# Patient Record
Sex: FEMALE
[De-identification: names, ages, dates, MRNs, and addresses within clinical notes are randomized per-mention and may not be internally consistent; named-entity substitution may affect disease eponyms.]

---

## 2022-01-27 ENCOUNTER — NURSE TRIAGE (OUTPATIENT)
Dept: OTHER | Facility: CLINIC | Age: 32
End: 2022-01-27

## 2022-01-28 NOTE — TELEPHONE ENCOUNTER
Wilner Garcia called to check on Coricidin and Mucinex. Advised of Uptodate recommendations.      Reason for Disposition   [1] Caller has medicine question about med NOT prescribed by PCP AND [2] triager unable to answer question (e.g., compatibility with other med, storage)    Protocols used: MEDICATION QUESTION CALL-ADULT-

## 2022-11-21 ENCOUNTER — HOSPITAL ENCOUNTER (OUTPATIENT)
Age: 32
Discharge: HOME | End: 2022-11-21
Payer: MEDICARE

## 2022-11-21 ENCOUNTER — OFFICE VISIT (OUTPATIENT)
Dept: RHEUMATOLOGY | Age: 32
End: 2022-11-21
Payer: MEDICARE

## 2022-11-21 ENCOUNTER — NURSE ONLY (OUTPATIENT)
Dept: LAB | Age: 32
End: 2022-11-21

## 2022-11-21 ENCOUNTER — HOSPITAL ENCOUNTER (OUTPATIENT)
Dept: GENERAL RADIOLOGY | Age: 32
Discharge: HOME | End: 2022-11-21
Payer: MEDICARE

## 2022-11-21 VITALS
WEIGHT: 290 LBS | HEART RATE: 86 BPM | HEIGHT: 64 IN | BODY MASS INDEX: 49.51 KG/M2 | SYSTOLIC BLOOD PRESSURE: 122 MMHG | DIASTOLIC BLOOD PRESSURE: 78 MMHG | OXYGEN SATURATION: 98 %

## 2022-11-21 DIAGNOSIS — M19.90 INFLAMMATORY ARTHRITIS: ICD-10-CM

## 2022-11-21 DIAGNOSIS — L40.0 PLAQUE PSORIASIS: ICD-10-CM

## 2022-11-21 DIAGNOSIS — M19.90 INFLAMMATORY ARTHRITIS: Primary | ICD-10-CM

## 2022-11-21 LAB
BASOPHILS # BLD: 0.4 %
BASOPHILS ABSOLUTE: 0 THOU/MM3 (ref 0–0.1)
C-REACTIVE PROTEIN: 3.71 MG/DL (ref 0–1)
EOSINOPHIL # BLD: 1 %
EOSINOPHILS ABSOLUTE: 0.1 THOU/MM3 (ref 0–0.4)
ERYTHROCYTE [DISTWIDTH] IN BLOOD BY AUTOMATED COUNT: 14.4 % (ref 11.5–14.5)
ERYTHROCYTE [DISTWIDTH] IN BLOOD BY AUTOMATED COUNT: 49.6 FL (ref 35–45)
HCT VFR BLD CALC: 39.8 % (ref 37–47)
HEMOGLOBIN: 12.3 GM/DL (ref 12–16)
IMMATURE GRANS (ABS): 0.07 THOU/MM3 (ref 0–0.07)
IMMATURE GRANULOCYTES: 1 %
LYMPHOCYTES # BLD: 25.6 %
LYMPHOCYTES ABSOLUTE: 1.7 THOU/MM3 (ref 1–4.8)
MCH RBC QN AUTO: 29.2 PG (ref 26–33)
MCHC RBC AUTO-ENTMCNC: 30.9 GM/DL (ref 32.2–35.5)
MCV RBC AUTO: 94.5 FL (ref 81–99)
MONOCYTES # BLD: 5.7 %
MONOCYTES ABSOLUTE: 0.4 THOU/MM3 (ref 0.4–1.3)
NUCLEATED RED BLOOD CELLS: 0 /100 WBC
PLATELET # BLD: 281 THOU/MM3 (ref 130–400)
PMV BLD AUTO: 9.1 FL (ref 9.4–12.4)
RBC # BLD: 4.21 MILL/MM3 (ref 4.2–5.4)
RHEUMATOID FACTOR: < 10 IU/ML (ref 0–13)
SEDIMENTATION RATE, ERYTHROCYTE: 50 MM/HR (ref 0–20)
SEG NEUTROPHILS: 66.3 %
SEGMENTED NEUTROPHILS ABSOLUTE COUNT: 4.5 THOU/MM3 (ref 1.8–7.7)
WBC # BLD: 6.8 THOU/MM3 (ref 4.8–10.8)

## 2022-11-21 PROCEDURE — G8484 FLU IMMUNIZE NO ADMIN: HCPCS | Performed by: INTERNAL MEDICINE

## 2022-11-21 PROCEDURE — 99204 OFFICE O/P NEW MOD 45 MIN: CPT | Performed by: INTERNAL MEDICINE

## 2022-11-21 PROCEDURE — G8427 DOCREV CUR MEDS BY ELIG CLIN: HCPCS | Performed by: INTERNAL MEDICINE

## 2022-11-21 PROCEDURE — 4004F PT TOBACCO SCREEN RCVD TLK: CPT | Performed by: INTERNAL MEDICINE

## 2022-11-21 PROCEDURE — 73130 X-RAY EXAM OF HAND: CPT

## 2022-11-21 PROCEDURE — G8417 CALC BMI ABV UP PARAM F/U: HCPCS | Performed by: INTERNAL MEDICINE

## 2022-11-21 RX ORDER — FERROUS SULFATE 325(65) MG
325 TABLET ORAL
COMMUNITY

## 2022-11-21 RX ORDER — ETODOLAC 400 MG/1
400 TABLET, FILM COATED ORAL 2 TIMES DAILY
Qty: 60 TABLET | Refills: 2 | Status: SHIPPED | OUTPATIENT
Start: 2022-11-21 | End: 2023-11-21

## 2022-11-21 RX ORDER — LAMOTRIGINE 200 MG/1
1 TABLET ORAL DAILY
COMMUNITY
Start: 2022-11-15

## 2022-11-21 RX ORDER — LEVOTHYROXINE SODIUM 0.07 MG/1
1 TABLET ORAL DAILY
COMMUNITY
Start: 2022-10-07

## 2022-11-21 ASSESSMENT — ENCOUNTER SYMPTOMS
NAUSEA: 0
BLOOD IN STOOL: 0
WHEEZING: 0
COUGH: 0
VOMITING: 0
ABDOMINAL PAIN: 0
SHORTNESS OF BREATH: 0

## 2022-11-21 NOTE — PROGRESS NOTES
[address] [facility]   Rheumatology Clinic Note      11/21/2022       Reason for Consult:  pain and swelling in joints  Requesting Physician:  Elizabeth Franks MD     CHIEF COMPLAINT:    Chief Complaint   Patient presents with    New Patient     New pt other synovitis and tenosynovitis Lt hand other specified tissues disorder     BACK BILATERAL FEET, FINGERS RT HAND SWELLING AT TIMES VERY PAINFUL            HISTORY OF PRESENT ILLNESS:    27 y.o. female presents today for evaluation of pain and swelling in joints. A month and a half ago, started having pain and swelling in ankles and hands. Ibuprofen eases her symptoms. Pain is in the left 4th digit base. Having difficulty making a fist.    Pain improved after receiving steroid injection by orthopedics. But swelling persist. And cannot make a fist with her left hand. Has morning stiffness. Pain and stiffness is most pronounced upon awakening in the morning. This improves partially as day goes by with movement. Ibuprofen provides temporary relief. Has small area of psoriasis on both knees. Past Medical History:     has a past medical history of Anemia, Hypothyroid, and Seizures ([address] Past Surgical History:     has no past surgical history on file. Current Medications:      Current Outpatient Medications:     lamoTRIgine (LAMICTAL) 200 MG tablet, Take 1 tablet by mouth daily, Disp: , Rfl:     levothyroxine (SYNTHROID) 75 MCG tablet, Take 1 tablet by mouth daily, Disp: , Rfl:     ferrous sulfate (IRON 325) 325 (65 Fe) MG tablet, Take 325 mg by mouth daily (with breakfast), Disp: , Rfl:     etodolac (LODINE) 400 MG tablet, Take 1 tablet by mouth 2 times daily Take with food, Disp: 60 tablet, Rfl: 2    Allergies:    Patient has no known allergies. Social History:     reports that she has never smoked. She has never used smokeless tobacco. She reports that she does not currently use alcohol.  She reports that she does not currently use drugs. Family History:   family history includes Diabetes in her paternal grandmother. REVIEW OF SYSTEMS:    Review of Systems   Constitutional:  Negative for chills, fever and unexpected weight change. HENT:  Negative for mouth sores. Respiratory:  Negative for cough, shortness of breath and wheezing. Cardiovascular:  Negative for chest pain. Gastrointestinal:  Negative for abdominal pain, blood in stool, nausea and vomiting. Skin:  Positive for rash. Neurological:  Negative for headaches. PHYSICAL EXAM:    Vitals:    /78 (Site: Left Lower Arm, Position: Sitting, Cuff Size: Medium Adult)   Pulse 86   Ht 5' 4\" (1.626 m)   Wt 290 lb (131.5 kg)   SpO2 98%   BMI 49.78 kg/m²     Physical Exam  Constitutional:       General: She is not in acute distress. Appearance: Normal appearance. HENT:      Mouth/Throat:      Mouth: Mucous membranes are moist.      Pharynx: Oropharynx is clear. Eyes:      Extraocular Movements: Extraocular movements intact. Conjunctiva/sclera: Conjunctivae normal.   Cardiovascular:      Rate and Rhythm: Normal rate and regular rhythm. Heart sounds: Normal heart sounds. No murmur heard. No friction rub. Pulmonary:      Effort: Pulmonary effort is normal. No respiratory distress. Breath sounds: Normal breath sounds. No wheezing or rhonchi. Musculoskeletal:         General: Swelling and tenderness present. No deformity. Normal range of motion. Cervical back: Normal range of motion and neck supple. Right lower leg: No edema. Left lower leg: No edema. Comments: Unable to make a fist with her right hand. Swelling with tenderness in right 4th MCP. Swelling without tenderness in right 4th PIP joint. Swelling in ankles without tenderness. Lymphadenopathy:      Cervical: No cervical adenopathy. Skin:     General: Skin is warm and dry. Findings: Rash (plaque psoriasis on both knees) present. No lesion. Neurological:      General: No focal deficit present. Mental Status: She is alert and oriented to person, place, and time. Cranial Nerves: No cranial nerve deficit. Gait: Gait normal.   Psychiatric:         Mood and Affect: Mood normal.          DATA:                                  IMPRESSION/RECOMMENDATIONS:      1. Suspect psoriatic arthritis contributing to pt's presentation. She has oligoarticular arthritis with new symptoms for 1.5 months. -- trial of etodolac 400 mg bid. Discussed with pt the benefits, risks and adverse effects of this medication.  Patient expressed understanding.  -- labs ordered  -- xray of left hand    RTC in 2 weeks        Orders Placed This Encounter   Procedures    XR HAND LEFT (MIN 3 VIEWS)     Standing Status:   Future     Number of Occurrences:   1     Standing Expiration Date:   11/21/2023    Sedimentation Rate     Standing Status:   Future     Number of Occurrences:   1     Standing Expiration Date:   11/21/2023    C-Reactive Protein     Standing Status:   Future     Number of Occurrences:   1     Standing Expiration Date:   11/21/2023    CBC with Auto Differential     Standing Status:   Future     Number of Occurrences:   1     Standing Expiration Date:   5/21/2023    Rheumatoid Factor     Standing Status:   Future     Number of Occurrences:   1     Standing Expiration Date:   3/05/9036    Cyclic Citrul Peptide Antibody, IgG     Standing Status:   Future     Number of Occurrences:   1     Standing Expiration Date:   11/21/2023          Chuck Robles MD    [address]   [address] [facility]. [address] [facility]  [address] [place] [address]

## 2022-11-24 LAB — CYCLIC CITRULLINATED PEPTIDE ANTIBODY IGG: 1.2 U/ML (ref 0–7)

## 2022-12-05 ENCOUNTER — OFFICE VISIT (OUTPATIENT)
Dept: RHEUMATOLOGY | Age: 32
End: 2022-12-05
Payer: MEDICARE

## 2022-12-05 VITALS
BODY MASS INDEX: 49.51 KG/M2 | WEIGHT: 290 LBS | HEIGHT: 64 IN | DIASTOLIC BLOOD PRESSURE: 72 MMHG | OXYGEN SATURATION: 99 % | HEART RATE: 59 BPM | SYSTOLIC BLOOD PRESSURE: 140 MMHG

## 2022-12-05 DIAGNOSIS — M19.90 INFLAMMATORY ARTHRITIS: Primary | ICD-10-CM

## 2022-12-05 PROCEDURE — 99214 OFFICE O/P EST MOD 30 MIN: CPT | Performed by: INTERNAL MEDICINE

## 2022-12-05 PROCEDURE — G8417 CALC BMI ABV UP PARAM F/U: HCPCS | Performed by: INTERNAL MEDICINE

## 2022-12-05 PROCEDURE — G8427 DOCREV CUR MEDS BY ELIG CLIN: HCPCS | Performed by: INTERNAL MEDICINE

## 2022-12-05 PROCEDURE — 1036F TOBACCO NON-USER: CPT | Performed by: INTERNAL MEDICINE

## 2022-12-05 PROCEDURE — G8484 FLU IMMUNIZE NO ADMIN: HCPCS | Performed by: INTERNAL MEDICINE

## 2022-12-05 ASSESSMENT — ENCOUNTER SYMPTOMS
COUGH: 0
VOMITING: 0
SHORTNESS OF BREATH: 0
NAUSEA: 0
ABDOMINAL PAIN: 0

## 2022-12-05 NOTE — PROGRESS NOTES
[address] [facility]   Rheumatology Clinic Note      12/5/2022         CHIEF COMPLAINT:    Chief Complaint   Patient presents with    Follow-up     2 week f/u Inflammatory arthritis           HISTORY OF PRESENT ILLNESS:    27 y.o. female with suspected psoriatic arthritis presents for follow up. When seen last, labs were ordered and she was started on etodolac 400 mg bid. Reports significant improvement in her joint pain and swelling since starting etodolac. Reports, however, that the effects of medication lasts for around 8-10 hours. Notices pain and stiffness prior to her next dose. Overall, improvement. No significant joint swelling  Pain is most pronounced in lower back and feet. Has small area of psoriasis on both knees. Past Medical History:     has a past medical history of Anemia, Hypothyroid, and Seizures ([address] Past Surgical History:     has no past surgical history on file. Current Medications:      Current Outpatient Medications:     lamoTRIgine (LAMICTAL) 200 MG tablet, Take 1 tablet by mouth daily, Disp: , Rfl:     levothyroxine (SYNTHROID) 75 MCG tablet, Take 1 tablet by mouth daily, Disp: , Rfl:     ferrous sulfate (IRON 325) 325 (65 Fe) MG tablet, Take 325 mg by mouth daily (with breakfast), Disp: , Rfl:     etodolac (LODINE) 400 MG tablet, Take 1 tablet by mouth 2 times daily Take with food, Disp: 60 tablet, Rfl: 2    Allergies:    Patient has no known allergies. Social History:     reports that she has never smoked. She has never used smokeless tobacco. She reports that she does not currently use alcohol. She reports that she does not currently use drugs. Family History:   family history includes Diabetes in her paternal grandmother. REVIEW OF SYSTEMS:    Review of Systems   Constitutional:  Negative for chills, fever and unexpected weight change. Respiratory:  Negative for cough and shortness of breath. Cardiovascular:  Negative for chest pain. Gastrointestinal:  Negative for abdominal pain, nausea and vomiting. Skin:  Positive for rash. Neurological:  Negative for headaches. PHYSICAL EXAM:    Vitals:    BP (!) 140/72 (Site: Left Upper Arm, Position: Sitting, Cuff Size: Medium Adult)   Pulse 59   Ht 5' 4.02\" (1.626 m)   Wt 290 lb (131.5 kg)   SpO2 99%   BMI 49.75 kg/m²     Physical Exam  Constitutional:       General: She is not in acute distress. Appearance: Normal appearance. Eyes:      Conjunctiva/sclera: Conjunctivae normal.   Pulmonary:      Effort: Pulmonary effort is normal.   Musculoskeletal:         General: Tenderness present. No swelling. Normal range of motion. Cervical back: Neck supple. Right lower leg: No edema. Left lower leg: No edema. Skin:     General: Skin is warm and dry. Findings: Rash (plaque psoriasis on both knees) present. Neurological:      General: No focal deficit present. Mental Status: She is alert and oriented to person, place, and time. Gait: Gait normal.   Psychiatric:         Mood and Affect: Mood normal.          DATA:      10/22/2022:       Latest Reference Range & Units 11/21/22 12:35   CRP 0.00 - 1.00 mg/dl 3.71 (H)      Latest Reference Range & Units 11/21/22 12:35   WBC 4.8 - 10.8 thou/mm3 6.8   RBC 4.20 - 5.40 mill/mm3 4.21   Hemoglobin Quant 12.0 - 16.0 gm/dl 12.3   Hematocrit 37.0 - 47.0 % 39.8   MCV 81.0 - 99.0 fL 94.5   MCH 26.0 - 33.0 pg 29.2   MCHC 32.2 - 35.5 gm/dl 30.9 (L)   MPV 9.4 - 12.4 fL 9.1 (L)   RDW-CV 11.5 - 14.5 % 14.4   RDW-SD 35.0 - 45.0 fL 49.6 (H)   Platelet Count 752 - 400 thou/mm3 281      Latest Reference Range & Units 11/21/22 12:35   Sed Rate 0 - 20 mm/hr 50 (H)      Latest Reference Range & Units 11/21/22 12:35   Rheumatoid Factor 0 - 13 IU/mL < 10   CC Peptide,IgG Ab 0.0 - 7.0 U/mL 1.2       XRAY Left Hand 11/21/2022: There are no fractures. There is no dislocation.   The digits are normal. The metacarpals are normal.  The joint spaces are preserved. The soft tissues are normal.             IMPRESSION/RECOMMENDATIONS:      1. Suspect psoriatic arthritis. Clinically improved after starting etodolac 400 mg daily. -- reviewed with pt her lab results and xray hand report  -- continue etodolac 400 mg bid  -- suggested to pt taking turmeric supplment    RTC in 2 months      No orders of the defined types were placed in this encounter.          Denise Lester MD    [address] [facility]  [address] [facility]. [address]  [phone]

## 2023-02-06 ENCOUNTER — OFFICE VISIT (OUTPATIENT)
Dept: RHEUMATOLOGY | Age: 33
End: 2023-02-06
Payer: COMMERCIAL

## 2023-02-06 VITALS
OXYGEN SATURATION: 98 % | HEART RATE: 86 BPM | HEIGHT: 64 IN | WEIGHT: 251 LBS | DIASTOLIC BLOOD PRESSURE: 80 MMHG | SYSTOLIC BLOOD PRESSURE: 124 MMHG | BODY MASS INDEX: 42.85 KG/M2

## 2023-02-06 DIAGNOSIS — M19.90 INFLAMMATORY ARTHRITIS: ICD-10-CM

## 2023-02-06 PROCEDURE — G8417 CALC BMI ABV UP PARAM F/U: HCPCS | Performed by: INTERNAL MEDICINE

## 2023-02-06 PROCEDURE — G8427 DOCREV CUR MEDS BY ELIG CLIN: HCPCS | Performed by: INTERNAL MEDICINE

## 2023-02-06 PROCEDURE — 1036F TOBACCO NON-USER: CPT | Performed by: INTERNAL MEDICINE

## 2023-02-06 PROCEDURE — G8484 FLU IMMUNIZE NO ADMIN: HCPCS | Performed by: INTERNAL MEDICINE

## 2023-02-06 PROCEDURE — 99214 OFFICE O/P EST MOD 30 MIN: CPT | Performed by: INTERNAL MEDICINE

## 2023-02-06 RX ORDER — ETODOLAC 400 MG/1
400 TABLET, FILM COATED ORAL 2 TIMES DAILY
Qty: 60 TABLET | Refills: 5 | Status: SHIPPED | OUTPATIENT
Start: 2023-02-06 | End: 2024-02-06

## 2023-02-06 ASSESSMENT — ENCOUNTER SYMPTOMS
NAUSEA: 0
SHORTNESS OF BREATH: 0
COUGH: 0
ABDOMINAL PAIN: 0
VOMITING: 0

## 2023-02-06 NOTE — PROGRESS NOTES
[facility]) Physicians   Rheumatology Clinic Note      2/6/2023         CHIEF COMPLAINT:    Chief Complaint   Patient presents with    Follow-up     2 MONTH F/U Inflammatory arthritis    Bilateral feet, lt hand fingers feels pretty good as long as she takes her meds            HISTORY OF PRESENT ILLNESS:    [address] y.o. female with suspected psoriatic arthritis presents for follow up. She is on etodolac 400 mg bid. Pain in left hand joints. Pain and swelling. Having difficulty bending the joint. Pain and stiffness in her back. 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 Has prolonged morning stiffness for few minutes. Reports that overall she is way better than prior to starting etodolac. Is having more good days than rough days. Is satisfied with her current condition. Has small area of psoriasis on both knees. Past Medical History:     has a past medical history of Anemia, Hypothyroid, and Seizures ([facility] [address] Past Surgical History:     has no past surgical history on file. Current Medications:      Current Outpatient Medications:     lamoTRIgine (LAMICTAL) 200 MG tablet, Take 1 tablet by mouth daily, Disp: , Rfl:     levothyroxine (SYNTHROID) 75 MCG tablet, Take 1 tablet by mouth daily, Disp: , Rfl:     ferrous sulfate (IRON 325) 325 (65 Fe) MG tablet, Take 325 mg by mouth daily (with breakfast), Disp: , Rfl:     etodolac (LODINE) 400 MG tablet, Take 1 tablet by mouth 2 times daily Take with food, Disp: 60 tablet, Rfl: 2    Allergies:    Patient has no known allergies. Social History:     reports that she has never smoked. She has never used smokeless tobacco. She reports that she does not currently use alcohol. She reports that she does not currently use drugs. Family History:   family history includes Diabetes in her paternal grandmother. REVIEW OF SYSTEMS:    Review of Systems   Constitutional:  Negative for chills, fever and unexpected weight change.    Respiratory: Negative for cough and shortness of breath. Cardiovascular:  Negative for chest pain. Gastrointestinal:  Negative for abdominal pain, nausea and vomiting. Skin:  Positive for rash. Neurological:  Negative for headaches. PHYSICAL EXAM:    Vitals:    /80 (Site: Right Lower Arm, Position: Sitting, Cuff Size: Medium Adult)   Pulse 86   Ht 5' 4.02\" (1.626 m)   Wt 251 lb (113.9 kg)   SpO2 98%   BMI 43.06 kg/m²     Physical Exam  Constitutional:       General: She is not in acute distress. Appearance: Normal appearance. Eyes:      Conjunctiva/sclera: Conjunctivae normal.   Pulmonary:      Effort: Pulmonary effort is normal.   Musculoskeletal:         General: Tenderness present. No swelling. Normal range of motion. Cervical back: Neck supple. Right lower leg: No edema. Left lower leg: No edema. Skin:     General: Skin is warm and dry. Findings: Rash (plaque psoriasis on both knees) present. Neurological:      General: No focal deficit present. Mental Status: She is alert and oriented to person, place, and time. Gait: Gait normal.   Psychiatric:         Mood and Affect: Mood normal.          DATA:      10/22/2022:       Latest Reference Range & Units 11/21/22 12:35   CRP 0.00 - 1.00 mg/dl 3.71 (H)      Latest Reference Range & Units 11/21/22 12:35   WBC 4.8 - 10.8 thou/mm3 6.8   RBC 4.20 - 5.40 mill/mm3 4.21   Hemoglobin Quant 12.0 - 16.0 gm/dl 12.3   Hematocrit 37.0 - 47.0 % 39.8   MCV 81.0 - 99.0 fL 94.5   MCH 26.0 - 33.0 pg 29.2   MCHC 32.2 - 35.5 gm/dl 30.9 (L)   MPV 9.4 - 12.4 fL 9.1 (L)   RDW-CV 11.5 - 14.5 % 14.4   RDW-SD 35.0 - 45.0 fL 49.6 (H)   Platelet Count 139 - 400 thou/mm3 281      Latest Reference Range & Units 11/21/22 12:35   Sed Rate 0 - 20 mm/hr 50 (H)      Latest Reference Range & Units 11/21/22 12:35   Rheumatoid Factor 0 - 13 IU/mL < 10   CC Peptide,IgG Ab 0.0 - 7.0 U/mL 1.2       XRAY Left Hand 11/21/2022: There are no fractures. There is no dislocation. The digits are normal. The metacarpals are normal.  The joint spaces are preserved. The soft tissues are normal.         RAPID3 Composite Score = MDHAQ (0-10) + Patient pain VAS (0-10): + Patient global assessment VAS (0-10):     2/6/2023 --- RAPID 3: 1.7 + 3 + 3 = 7.7     Remission: <3  Low Disease Activity: <6  Moderate Disease Activity: >=6 and <=12  High Disease Activity: >12        IMPRESSION/RECOMMENDATIONS:      1. Suspect psoriatic arthritis. Clinically improved after starting etodolac 400 mg daily. -- discussed with pt treatment options, from adding DMARDs to her current regimen. She is happy with how she is doing and would rather continue with etodolac for now. -- continue etodolac 400 mg bid    RTC in 6 months      No orders of the defined types were placed in this encounter.          Tharon Schilder, MD    [address] [facility]  [address] [facility]. [address] [facility]  [address] [facility]  [phone]

## 2023-08-22 ENCOUNTER — NURSE ONLY (OUTPATIENT)
Dept: LAB | Age: 33
End: 2023-08-22

## 2023-08-22 ENCOUNTER — OFFICE VISIT (OUTPATIENT)
Dept: RHEUMATOLOGY | Age: 33
End: 2023-08-22
Payer: COMMERCIAL

## 2023-08-22 VITALS
HEIGHT: 64 IN | WEIGHT: 236.6 LBS | DIASTOLIC BLOOD PRESSURE: 76 MMHG | OXYGEN SATURATION: 96 % | HEART RATE: 74 BPM | SYSTOLIC BLOOD PRESSURE: 166 MMHG | BODY MASS INDEX: 40.39 KG/M2

## 2023-08-22 DIAGNOSIS — Z51.81 ENCOUNTER FOR MONITORING CHRONIC NSAID THERAPY: ICD-10-CM

## 2023-08-22 DIAGNOSIS — Z79.1 ENCOUNTER FOR MONITORING CHRONIC NSAID THERAPY: ICD-10-CM

## 2023-08-22 DIAGNOSIS — L40.50 PSORIATIC ARTHRITIS (HCC): ICD-10-CM

## 2023-08-22 DIAGNOSIS — L40.50 PSORIATIC ARTHRITIS (HCC): Primary | ICD-10-CM

## 2023-08-22 LAB
ANION GAP SERPL CALC-SCNC: 10 MEQ/L (ref 8–16)
BUN SERPL-MCNC: 15 MG/DL (ref 7–22)
CALCIUM SERPL-MCNC: 9.1 MG/DL (ref 8.5–10.5)
CHLORIDE SERPL-SCNC: 104 MEQ/L (ref 98–111)
CO2 SERPL-SCNC: 28 MEQ/L (ref 23–33)
CREAT SERPL-MCNC: 0.6 MG/DL (ref 0.4–1.2)
GFR SERPL CREATININE-BSD FRML MDRD: > 60 ML/MIN/1.73M2
GLUCOSE SERPL-MCNC: 91 MG/DL (ref 70–108)
POTASSIUM SERPL-SCNC: 4.5 MEQ/L (ref 3.5–5.2)
SODIUM SERPL-SCNC: 142 MEQ/L (ref 135–145)

## 2023-08-22 PROCEDURE — 1036F TOBACCO NON-USER: CPT | Performed by: INTERNAL MEDICINE

## 2023-08-22 PROCEDURE — 99214 OFFICE O/P EST MOD 30 MIN: CPT | Performed by: INTERNAL MEDICINE

## 2023-08-22 PROCEDURE — G8427 DOCREV CUR MEDS BY ELIG CLIN: HCPCS | Performed by: INTERNAL MEDICINE

## 2023-08-22 PROCEDURE — G8417 CALC BMI ABV UP PARAM F/U: HCPCS | Performed by: INTERNAL MEDICINE

## 2023-08-22 ASSESSMENT — ENCOUNTER SYMPTOMS
NAUSEA: 0
SHORTNESS OF BREATH: 0
COUGH: 0
VOMITING: 0
ABDOMINAL PAIN: 0

## 2023-08-22 NOTE — PROGRESS NOTES
[facility]) Physicians   Rheumatology Clinic Note      8/22/2023         CHIEF COMPLAINT:    Chief Complaint   Patient presents with    6 Month Follow-Up     Inflammatory arthritis    Other     Pt is tolerating well. No pain            HISTORY OF PRESENT ILLNESS:    26 y.o. female with suspected psoriatic arthritis presents for follow up. She is on etodolac 400 mg bid. No flare ups since seen last. Has episodes of pain in 4th left knuckle. Tolerable. No prolonged morning stiffness. No other joint pain. Has been taking etodolac 400 mg once daily. Has missed few days and notices worsening pain and stiffness. Has small area of psoriasis on both knees. Past Medical History:     has a past medical history of Anemia, Hypothyroid, and Seizures ([address] Past Surgical History:     has no past surgical history on file. Current Medications:      Current Outpatient Medications:     etodolac (LODINE) 400 MG tablet, Take 1 tablet by mouth 2 times daily Take with food, Disp: 60 tablet, Rfl: 5    lamoTRIgine (LAMICTAL) 200 MG tablet, Take 1 tablet by mouth daily, Disp: , Rfl:     levothyroxine (SYNTHROID) 75 MCG tablet, Take 1 tablet by mouth daily, Disp: , Rfl:     ferrous sulfate (IRON 325) 325 (65 Fe) MG tablet, Take 325 mg by mouth daily (with breakfast) (Patient not taking: Reported on 8/22/2023), Disp: , Rfl:     Allergies:    Patient has no known allergies. Social History:     reports that she has never smoked. She has never used smokeless tobacco. She reports that she does not currently use alcohol. She reports that she does not currently use drugs. Family History:   family history includes Diabetes in her paternal grandmother; No Known Problems in her father and mother. REVIEW OF SYSTEMS:    Review of Systems   Constitutional:  Negative for chills and fever. Respiratory:  Negative for cough and shortness of breath. Cardiovascular:  Negative for chest pain.    Gastrointestinal:  Negative

## 2024-11-04 ENCOUNTER — OFFICE VISIT (OUTPATIENT)
Dept: RHEUMATOLOGY | Age: 34
End: 2024-11-04
Payer: COMMERCIAL

## 2024-11-04 ENCOUNTER — LAB (OUTPATIENT)
Dept: LAB | Age: 34
End: 2024-11-04

## 2024-11-04 VITALS
HEART RATE: 77 BPM | OXYGEN SATURATION: 98 % | DIASTOLIC BLOOD PRESSURE: 76 MMHG | BODY MASS INDEX: 45.62 KG/M2 | SYSTOLIC BLOOD PRESSURE: 114 MMHG | WEIGHT: 267.2 LBS | HEIGHT: 64 IN

## 2024-11-04 DIAGNOSIS — L40.50 PSORIATIC ARTHRITIS (HCC): Primary | Chronic | ICD-10-CM

## 2024-11-04 DIAGNOSIS — L40.50 PSORIATIC ARTHRITIS (HCC): Chronic | ICD-10-CM

## 2024-11-04 DIAGNOSIS — Z79.1 NSAID LONG-TERM USE: ICD-10-CM

## 2024-11-04 DIAGNOSIS — L40.0 PLAQUE PSORIASIS: ICD-10-CM

## 2024-11-04 LAB
ANION GAP SERPL CALC-SCNC: 11 MEQ/L (ref 8–16)
BUN SERPL-MCNC: 17 MG/DL (ref 7–22)
CALCIUM SERPL-MCNC: 8.9 MG/DL (ref 8.5–10.5)
CHLORIDE SERPL-SCNC: 104 MEQ/L (ref 98–111)
CO2 SERPL-SCNC: 23 MEQ/L (ref 23–33)
CREAT SERPL-MCNC: 0.5 MG/DL (ref 0.4–1.2)
CRP SERPL-MCNC: 0.8 MG/DL (ref 0–1)
GFR SERPL CREATININE-BSD FRML MDRD: > 90 ML/MIN/1.73M2
GLUCOSE SERPL-MCNC: 98 MG/DL (ref 70–108)
POTASSIUM SERPL-SCNC: 4.4 MEQ/L (ref 3.5–5.2)
SODIUM SERPL-SCNC: 138 MEQ/L (ref 135–145)

## 2024-11-04 PROCEDURE — 99214 OFFICE O/P EST MOD 30 MIN: CPT | Performed by: INTERNAL MEDICINE

## 2024-11-04 PROCEDURE — 1036F TOBACCO NON-USER: CPT | Performed by: INTERNAL MEDICINE

## 2024-11-04 PROCEDURE — G8417 CALC BMI ABV UP PARAM F/U: HCPCS | Performed by: INTERNAL MEDICINE

## 2024-11-04 PROCEDURE — G8427 DOCREV CUR MEDS BY ELIG CLIN: HCPCS | Performed by: INTERNAL MEDICINE

## 2024-11-04 PROCEDURE — G8484 FLU IMMUNIZE NO ADMIN: HCPCS | Performed by: INTERNAL MEDICINE

## 2024-11-04 RX ORDER — ETODOLAC 400 MG/1
400 TABLET, FILM COATED ORAL 2 TIMES DAILY
Qty: 180 TABLET | Refills: 1 | Status: SHIPPED | OUTPATIENT
Start: 2024-11-04 | End: 2025-05-03

## 2024-11-04 RX ORDER — CLOBETASOL PROPIONATE 0.5 MG/G
CREAM TOPICAL
Qty: 30 G | Refills: 1 | Status: SHIPPED | OUTPATIENT
Start: 2024-11-04

## 2024-11-04 ASSESSMENT — ENCOUNTER SYMPTOMS
ABDOMINAL PAIN: 0
SHORTNESS OF BREATH: 0
COUGH: 0
NAUSEA: 0
VOMITING: 0

## 2024-11-04 NOTE — PROGRESS NOTES
RAPID 3: 0 + 1 + 0 = 1    11/4/24  --- RAPID 3: 1.7 + 2.5 + + 3 = 6.2    Remission: <3  Low Disease Activity: <6  Moderate Disease Activity: >=6 and <=12  High Disease Activity: >12        IMPRESSION/RECOMMENDATIONS:      1. Psoriatic arthritis. Clinically improved after starting etodolac 400 mg daily. No swollen or tender joints today.  -- continue etodolac 400 mg once daily    2. Encounter to monitor chronic NSAID use:  -- assess renal function.    3. Plaque psoriasis affecting knees and scalp.  -- trial of clobetasol topical steroids    RTC in 6 months     Orders Placed This Encounter   Procedures    Basic Metabolic Panel     Standing Status:   Future     Standing Expiration Date:   11/4/2025    C-Reactive Protein     Standing Status:   Future     Standing Expiration Date:   11/4/2025          Theresa Richardson MD    [facility] RHEUMATOLOGY  [address] [id number] [phone]

## 2025-05-20 ENCOUNTER — OFFICE VISIT (OUTPATIENT)
Age: 35
End: 2025-05-20
Payer: COMMERCIAL

## 2025-05-20 VITALS
OXYGEN SATURATION: 100 % | HEART RATE: 75 BPM | BODY MASS INDEX: 46.13 KG/M2 | SYSTOLIC BLOOD PRESSURE: 132 MMHG | HEIGHT: 64 IN | DIASTOLIC BLOOD PRESSURE: 84 MMHG | WEIGHT: 270.2 LBS

## 2025-05-20 DIAGNOSIS — Z51.81 ENCOUNTER FOR MONITORING CHRONIC NSAID THERAPY: ICD-10-CM

## 2025-05-20 DIAGNOSIS — Z79.1 ENCOUNTER FOR MONITORING CHRONIC NSAID THERAPY: ICD-10-CM

## 2025-05-20 DIAGNOSIS — L40.0 PLAQUE PSORIASIS: ICD-10-CM

## 2025-05-20 DIAGNOSIS — L40.50 PSORIATIC ARTHRITIS (HCC): Primary | Chronic | ICD-10-CM

## 2025-05-20 PROCEDURE — G8428 CUR MEDS NOT DOCUMENT: HCPCS | Performed by: NURSE PRACTITIONER

## 2025-05-20 PROCEDURE — 1036F TOBACCO NON-USER: CPT | Performed by: NURSE PRACTITIONER

## 2025-05-20 PROCEDURE — 99214 OFFICE O/P EST MOD 30 MIN: CPT | Performed by: NURSE PRACTITIONER

## 2025-05-20 PROCEDURE — 99213 OFFICE O/P EST LOW 20 MIN: CPT | Performed by: NURSE PRACTITIONER

## 2025-05-20 PROCEDURE — G8417 CALC BMI ABV UP PARAM F/U: HCPCS | Performed by: NURSE PRACTITIONER

## 2025-05-20 RX ORDER — ETODOLAC 400 MG/1
400 TABLET, FILM COATED ORAL 2 TIMES DAILY
Qty: 180 TABLET | Refills: 1 | Status: SHIPPED | OUTPATIENT
Start: 2025-05-20 | End: 2025-11-16

## 2025-05-20 RX ORDER — CLOBETASOL PROPIONATE 0.5 MG/G
CREAM TOPICAL
Qty: 30 G | Refills: 1 | Status: SHIPPED | OUTPATIENT
Start: 2025-05-20

## 2025-05-20 RX ORDER — AMOXICILLIN 875 MG/1
TABLET, COATED ORAL
COMMUNITY
Start: 2025-05-09

## 2025-05-20 ASSESSMENT — ENCOUNTER SYMPTOMS
SHORTNESS OF BREATH: 0
ABDOMINAL PAIN: 0
TROUBLE SWALLOWING: 0
DIARRHEA: 0
EYE PAIN: 0
COLOR CHANGE: 0
BLOOD IN STOOL: 0
BACK PAIN: 0

## 2025-05-20 NOTE — PROGRESS NOTES
11/21/2022      Lab Results   Component Value Date    CRP 0.80 11/04/2024     Lab Results   Component Value Date/Time    RF < 10 11/21/2022 12:35 PM      Lab Results   Component Value Date/Time    CYCCITPEPABG 1.2 11/21/2022 12:35 PM     RADIOLOGY:   No recent     RAPID3 Composite Score = MDHAQ (0-10) + Patient pain VAS (0-10): + Patient global assessment VAS (0-10):     2/6/2023 --- RAPID 3: 1.7 + 3 + 3 = 7.7   8/22/23  --- RAPID 3: 0 + 1 + 0 = 1   11/4/24  --- RAPID 3: 1.7 + 2.5 + + 3 = 6.2  5/20/25  --- RAPID 3: 0 + 1 + 0 = 1     Remission: <3  Low Disease: <6   Moderate Disease: >=6 & <=12  High Disease: >12    IMPRESSION/RECOMMENDATIONS:      1. Psoriatic arthritis: stable with low disease activity  -- Continue etodolac 400 mg twice daily- refills sent    2. Long-term NSAID use  -- Continue to monitor kidney function, CMP prior to follow-up appointment    3. Plaque psoriasis: stable with mild disease activity  -- Continue clobetasol cream as needed- refill sent  -- Continue psoriasis shampoo    Return in about 6 months (around 11/20/2025).     Orders Placed This Encounter   Procedures    Comprehensive Metabolic Panel     Standing Status:   Future     Expected Date:   11/20/2025     Expiration Date:   5/20/2026        Orders Placed This Encounter   Medications    etodolac (LODINE) 400 MG tablet     Sig: Take 1 tablet by mouth 2 times daily Take with food     Dispense:  180 tablet     Refill:  1    clobetasol prop emollient base 0.05 % CREA     Sig: Apply to affected area once daily     Dispense:  30 g     Refill:  1        Flower Osorio APRN - CNP     [facility] RHEUMATOLOGY  [address] [id number] [phone]